# Patient Record
Sex: MALE | Race: WHITE | NOT HISPANIC OR LATINO | Employment: FULL TIME | URBAN - METROPOLITAN AREA
[De-identification: names, ages, dates, MRNs, and addresses within clinical notes are randomized per-mention and may not be internally consistent; named-entity substitution may affect disease eponyms.]

---

## 2023-10-07 ENCOUNTER — HOSPITAL ENCOUNTER (EMERGENCY)
Facility: HOSPITAL | Age: 23
Discharge: HOME | End: 2023-10-07
Attending: EMERGENCY MEDICINE
Payer: MEDICAID

## 2023-10-07 VITALS
BODY MASS INDEX: 18.49 KG/M2 | HEART RATE: 64 BPM | SYSTOLIC BLOOD PRESSURE: 120 MMHG | DIASTOLIC BLOOD PRESSURE: 79 MMHG | RESPIRATION RATE: 18 BRPM | OXYGEN SATURATION: 100 % | HEIGHT: 65 IN | TEMPERATURE: 98 F | WEIGHT: 111 LBS

## 2023-10-07 DIAGNOSIS — S61.412A LACERATION OF LEFT HAND WITHOUT FOREIGN BODY, INITIAL ENCOUNTER: Primary | ICD-10-CM

## 2023-10-07 PROCEDURE — 25000003 PHARM REV CODE 250: Performed by: NURSE PRACTITIONER

## 2023-10-07 PROCEDURE — 99284 EMERGENCY DEPT VISIT MOD MDM: CPT

## 2023-10-07 PROCEDURE — 90715 TDAP VACCINE 7 YRS/> IM: CPT | Performed by: NURSE PRACTITIONER

## 2023-10-07 PROCEDURE — 90471 IMMUNIZATION ADMIN: CPT | Performed by: NURSE PRACTITIONER

## 2023-10-07 PROCEDURE — 63600175 PHARM REV CODE 636 W HCPCS: Performed by: NURSE PRACTITIONER

## 2023-10-07 PROCEDURE — 12002 RPR S/N/AX/GEN/TRNK2.6-7.5CM: CPT

## 2023-10-07 RX ORDER — IBUPROFEN 600 MG/1
600 TABLET ORAL
Status: COMPLETED | OUTPATIENT
Start: 2023-10-07 | End: 2023-10-07

## 2023-10-07 RX ORDER — LIDOCAINE HYDROCHLORIDE 10 MG/ML
10 INJECTION, SOLUTION EPIDURAL; INFILTRATION; INTRACAUDAL; PERINEURAL
Status: COMPLETED | OUTPATIENT
Start: 2023-10-07 | End: 2023-10-07

## 2023-10-07 RX ADMIN — IBUPROFEN 600 MG: 600 TABLET ORAL at 06:10

## 2023-10-07 RX ADMIN — BACITRACIN ZINC, NEOMYCIN, POLYMYXIN B 1 EACH: 400; 3.5; 5 OINTMENT TOPICAL at 05:10

## 2023-10-07 RX ADMIN — LIDOCAINE HYDROCHLORIDE 100 MG: 10 SOLUTION INTRAVENOUS at 05:10

## 2023-10-07 RX ADMIN — TETANUS TOXOID, REDUCED DIPHTHERIA TOXOID AND ACELLULAR PERTUSSIS VACCINE, ADSORBED 0.5 ML: 5; 2.5; 8; 8; 2.5 SUSPENSION INTRAMUSCULAR at 05:10

## 2023-10-07 NOTE — ED PROVIDER NOTES
Encounter Date: 10/7/2023       History     Chief Complaint   Patient presents with    Hand Injury     Pt cut L hand on , bleeding controlled     The history is provided by the patient.   Laceration   The incident occurred just prior to arrival. The laceration is located on the Left hand. The laceration is 3 cm in size. Injury mechanism: . The pain has been Constant since onset. He reports no foreign bodies present. His tetanus status is unknown.     Review of patient's allergies indicates:  No Known Allergies  No past medical history on file.  No past surgical history on file.  No family history on file.     Review of Systems   Constitutional:  Negative for chills, fatigue and fever.   Respiratory:  Negative for cough and shortness of breath.    Cardiovascular:  Negative for chest pain.   Gastrointestinal:  Negative for abdominal pain, nausea and vomiting.   Musculoskeletal:  Negative for back pain, myalgias and neck pain.        +left hand pain   Skin:  Positive for wound. Negative for rash.   Neurological:  Negative for weakness and numbness.       Physical Exam     Initial Vitals [10/07/23 1650]   BP Pulse Resp Temp SpO2   120/79 64 18 97.9 °F (36.6 °C) 100 %      MAP       --         Physical Exam    Nursing note and vitals reviewed.  Constitutional: He appears well-developed and well-nourished. He is not diaphoretic. No distress.   HENT:   Head: Normocephalic and atraumatic.   Neck: Neck supple.   Normal range of motion.  Cardiovascular:  Normal rate, regular rhythm and intact distal pulses.           Pulmonary/Chest: No respiratory distress.   Musculoskeletal:         General: Normal range of motion.      Left hand: Laceration (3cm laceration left lateral hand. No active bleeding.  No palpable or visible foreign body is noted.  Distal sensation is intact.  Cap refill less than 2 seconds.  +range of motion to all fingers on left hand as well as against resistance.) and tenderness present.  No swelling or deformity. Normal sensation. Normal capillary refill. Normal pulse.        Hands:       Cervical back: Normal range of motion and neck supple.     Neurological: He is alert and oriented to person, place, and time. He has normal strength. No sensory deficit. GCS score is 15. GCS eye subscore is 4. GCS verbal subscore is 5. GCS motor subscore is 6.   Skin: Skin is warm and dry. Capillary refill takes less than 2 seconds.         ED Course   Lac Repair    Date/Time: 10/7/2023 6:40 PM    Performed by: Henrry Milton NP  Authorized by: Kyle Lange MD    Consent:     Consent obtained:  Verbal    Consent given by:  Patient    Risks, benefits, and alternatives were discussed: yes      Risks discussed:  Infection, pain, poor cosmetic result and poor wound healing    Alternatives discussed:  No treatment  Anesthesia:     Anesthesia method:  None  Laceration details:     Location:  Hand    Hand location:  L hand, dorsum    Length (cm):  3  Pre-procedure details:     Preparation:  Imaging obtained to evaluate for foreign bodies  Exploration:     Hemostasis achieved with:  Direct pressure    Imaging obtained: x-ray      Imaging outcome: foreign body not noted      Wound exploration: wound explored through full range of motion      Wound extent: no foreign bodies/material noted and no underlying fracture noted      Contaminated: no    Treatment:     Area cleansed with:  Povidone-iodine, chlorhexidine and saline    Amount of cleaning:  Standard    Irrigation solution:  Sterile saline  Skin repair:     Repair method:  Tissue adhesive  Approximation:     Approximation:  Close  Repair type:     Repair type:  Simple  Post-procedure details:     Dressing:  Open (no dressing)    Procedure completion:  Tolerated well, no immediate complications    Labs Reviewed - No data to display       Imaging Results              X-Ray Hand 3 view Left (Final result)  Result time 10/07/23 18:17:34      Final result by Gamal  MD Santos (10/07/23 18:17:34)                   Impression:      No definite abnormality identified      Electronically signed by: Santos Yeung  Date:    10/07/2023  Time:    18:17               Narrative:    EXAMINATION:  XR HAND COMPLETE 3 VIEW LEFT    CLINICAL HISTORY:  laceration;    TECHNIQUE:  PA, lateral, and oblique views of the left hand were performed.    COMPARISON:  None    FINDINGS:  No acute fracture or dislocation.  Normal bone mineral density.  Joint spaces are preserved.  The soft tissues are unremarkable.                                       Medications   Tdap (BOOSTRIX) vaccine injection 0.5 mL (0.5 mLs Intramuscular Given 10/7/23 1715)   neomycin-bacitracnZn-polymyxnB packet 1 each (1 each Topical (Top) Given 10/7/23 1717)   LIDOcaine (PF) 10 mg/ml (1%) injection 100 mg (100 mg Infiltration Given 10/7/23 1715)   ibuprofen tablet 600 mg (600 mg Oral Given 10/7/23 1853)     Medical Decision Making  Amount and/or Complexity of Data Reviewed  Radiology: ordered.    Risk  OTC drugs.  Prescription drug management.                   Regarding LACERATION CARE, patient was instructed to wash hands with soap and warm water before and after caring for wound; keep the wound dry for the first 24 to 48 hours and then gently clean the wound once or twice a day with cool water using soap to clean around the wound; avoid using alcohol or hydrogen peroxide to clean wound unless directed to; and use bandages to keep wound clean and protected and to prevent swelling.  Advised patient to contact primary healthcare provider if wound splits open; becomes extremely painful; appears to not be healing; has a foreign object present; develop a purulent discharge; or note the skin around the wound becoming numb, edematous, or erythematous.  Patient instructed to follow up with primary care provider for wound re-check 2 days.  I discussed wound care precautions with patient ; specifically that all wounds have risk of  infection despite efforts to cleanse and debride the wound; and there is a risk of an occult foreign body (and thus increased risk of infection) despite a negative examination.  I discussed with patient need to return for any signs of infection, specifically redness, increased pain, fever, drainage of pus, or any concern, immediately.      I discussed with patient  that evaluation in the ED does not suggest any emergent or life threatening medical conditions requiring immediate intervention beyond what was provided in the ED, and I believe patient is safe for discharge. Regardless, an unremarkable evaluation in the ED does not preclude the development or presence of a serious of life threatening condition. As such, patient was instructed to return immediately for any worsening or change in current symptoms.            Clinical Impression:   Final diagnoses:  [S61.412A] Laceration of left hand without foreign body, initial encounter (Primary)        ED Disposition Condition    Discharge Stable          ED Prescriptions    None       Follow-up Information       Follow up With Specialties Details Why Contact Info    [facility]  In 2 days  [address] [facility] [address]  [phone]      [facility] Emergency Dept. Emergency Medicine  As needed, If symptoms worsen [address] [facility] [address] [phone]             Henrry Milton, STACY  10/07/23 2147

## 2023-10-07 NOTE — Clinical Note
"Wilfredo Liu" Young was seen and treated in our emergency department on 10/7/2023.  He may return to work on 10/10/2023.       If you have any questions or concerns, please don't hesitate to call.      Henrry Milton NP"